# Patient Record
Sex: MALE | Race: WHITE | NOT HISPANIC OR LATINO | Employment: UNEMPLOYED | URBAN - METROPOLITAN AREA
[De-identification: names, ages, dates, MRNs, and addresses within clinical notes are randomized per-mention and may not be internally consistent; named-entity substitution may affect disease eponyms.]

---
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
## 2025-01-16 ENCOUNTER — TELEMEDICINE (OUTPATIENT)
Dept: PSYCHIATRY | Facility: CLINIC | Age: 20
End: 2025-01-16
Payer: COMMERCIAL

## 2025-01-16 DIAGNOSIS — F41.1 GAD (GENERALIZED ANXIETY DISORDER): ICD-10-CM

## 2025-01-16 DIAGNOSIS — F33.0 MILD EPISODE OF RECURRENT MAJOR DEPRESSIVE DISORDER (HCC): ICD-10-CM

## 2025-01-16 PROCEDURE — 90833 PSYTX W PT W E/M 30 MIN: CPT | Performed by: PHYSICIAN ASSISTANT

## 2025-01-16 PROCEDURE — 99214 OFFICE O/P EST MOD 30 MIN: CPT | Performed by: PHYSICIAN ASSISTANT

## 2025-01-16 RX ORDER — FLUOXETINE HYDROCHLORIDE 60 MG/1
60 TABLET, FILM COATED ORAL; ORAL DAILY
Qty: 30 TABLET | Refills: 1 | Status: SHIPPED | OUTPATIENT
Start: 2025-01-16

## 2025-01-16 NOTE — PSYCH
"MEDICATION MANAGEMENT NOTE      [facility]     Patient is located at Home in the following state in which I hold an active license [place]  The patient was identified by name and date of birth. Patient was informed that this is a telemedicine visit and that the visit is being conducted throughthe Epic Embedded platform. He agrees to proceed..  My office door was closed. No one else was in the room.  She acknowledged consent and understanding of privacy and security of the video platform. The patient has agreed to participate and understands they can discontinue the visit at any time.    Patient is aware this is a billable service.    Assessment & Plan  JOSE M (generalized anxiety disorder)  Fluoxetine increased to 60 mg daily.  Follow-up visit in 1 month or sooner if needed.  Call with any questions or concerns.  Patient contracts for safety denies SI.    Orders:    FLUoxetine 60 MG TABS; Take 1 tablet (60 mg total) by mouth daily    Mild episode of recurrent major depressive disorder (HCC)  Fluoxetine increased to 60 mg daily  Orders:    FLUoxetine 60 MG TABS; Take 1 tablet (60 mg total) by mouth daily       Visit Time    Visit Start Time: 1350  Visit Stop Time: 1420  Total Visit Duration: 30 minutes    Subjective:    Singh Waters is a 19 y.o. male seen virtually today for medication management psychiatric follow-up visit.  Patient reported some added stressors in his life right now.  Patient currently on winter break from college and is anxious about returning back to school.  Patient also states lots of \"family drama\" over the holidays which was also added stressor.  Patient overall feels that the Prozac is helping with his anxiety and depression but feels increase in dosage may be warranted.  Patient denies any SI or HI.  No delusional thinking, paranoia, jesus or psychosis noted.    HPI ROS Appetite Changes and Sleep: normal appetite, normal energy level, and normal " number of sleep hours    Review Of Systems:     Mood Anxiety   Behavior Normal    Thought Content Normal   General Normal    Personality Normal   Other Psych Symptoms Normal   Constitutional Negative   ENT Negative   Cardiovascular Negative   Respiratory Negative   Gastrointestinal Negative   Genitourinary Negative   Musculoskeletal Negative   Integumentary Negative   Neurological Negative   Endocrine Normal    Other Symptoms Normal        Social History     Substance and Sexual Activity   Drug Use Never            Laboratory Results: I have personally reviewed all pertinent laboratory/tests results            Family History   Problem Relation Age of Onset    Cancer Mother     Diabetes Father     Hypertension Father     Hyperlipidemia Father     Depression Brother     Depression Brother     Anxiety disorder Brother     Hypertension Brother     Squamous cell carcinoma Maternal Grandmother           The following portions of the patient's history were reviewed and updated as appropriate: allergies, current medications, past family history, past medical history, past social history, and problem list.    Social History        Social Drivers of Health     Tobacco Use: Low Risk  (10/24/2024)    Patient History     Smoking Tobacco Use: Never     Smokeless Tobacco Use: Never     Passive Exposure: Not on file   Alcohol Use: Not on file   Financial Resource Strain: Not on file   Food Insecurity: Not on file   Transportation Needs: Not on file   Physical Activity: Sufficiently Active (9/28/2022)    Exercise Vital Sign     Days of Exercise per Week: 4 days     Minutes of Exercise per Session: 60 min   Stress: Not on file   Social Connections: Not on file   Intimate Partner Violence: Not on file   Depression: Not on file   Housing Stability: Not on file   Utilities: Not on file   Health Literacy: Not on file      Objective:       Mental Status Evaluation:    Appearance:  age appropriate, dressed appropriately, adequate grooming    Behavior:  pleasant, cooperative, calm   Speech:  normal rate and volume   Mood:  anxious   Affect:  appropriate   Thought Process:  linear   Associations: intact associations   Thought Content:  no overt delusions   Perceptual Disturbances: no auditory hallucinations, no visual hallucinations   Risk Potential: Suicidal ideation - None  Homicidal ideation - None  Potential for aggression - No   Sensorium:  oriented to person, place, and time/date   Memory:  recent and remote memory grossly intact   Consciousness:  alert and awake   Attention: attention span and concentration are age appropriate   Insight:  good   Judgment: good   Gait/Station: normal gait/station   Motor Activity: no abnormal movements            Treatment Recommendations- Risks Benefits      Immediate Medical/Psychiatric/Psychotherapy Treatments and Any Precautions:    Risks, Benefits And Possible Side Effects Of Medications:  Risks, benefits, and possible side effects of medications explained to patient and patient verbalizes understanding     Discussed risks and benefits of treatment with patient including risk of suicidality, serotonin syndrome, increased QTc interval and SIADH related to treatment with antidepressants; Risk of induction of manic symptoms in certain patient populations     Controlled Medication Discussion:   Not applicable - controlled prescriptions are not prescribed by this practice    Individual psychotherapy provided: Yes  Counseling was provided during the session today for 16 minutes.  Medication changes discussed with Singh.  Goals discussed during in session: decrease anxiety and decrease depression.   Importance of medication and treatment compliance reviewed with Singh.  Reassurance and supportive therapy provided.   Crisis/safety plan [facility]15320} minutes.      Depression Follow-up Plan Completed: Yes    This note was not shared with the patient due to this is a psychotherapy note      Singh also verbalized an adequate safety plan to utilize should he feel he becomes a danger to self or others.  This plan includes him utilizing crisis hotline, or returning to the nearest emergency department.

## 2025-01-16 NOTE — ASSESSMENT & PLAN NOTE
Fluoxetine increased to 60 mg daily.  Follow-up visit in 1 month or sooner if needed.  Call with any questions or concerns.  Patient contracts for safety denies SI.    Orders:    FLUoxetine 60 MG TABS; Take 1 tablet (60 mg total) by mouth daily

## 2025-02-11 ENCOUNTER — OFFICE VISIT (OUTPATIENT)
Age: 20
End: 2025-02-11
Payer: COMMERCIAL

## 2025-02-11 VITALS
HEIGHT: 76 IN | TEMPERATURE: 96.5 F | OXYGEN SATURATION: 99 % | BODY MASS INDEX: 28.11 KG/M2 | DIASTOLIC BLOOD PRESSURE: 70 MMHG | HEART RATE: 86 BPM | SYSTOLIC BLOOD PRESSURE: 130 MMHG | WEIGHT: 230.82 LBS | RESPIRATION RATE: 18 BRPM

## 2025-02-11 DIAGNOSIS — Z23 ENCOUNTER FOR IMMUNIZATION: ICD-10-CM

## 2025-02-11 DIAGNOSIS — S61.011A LACERATION OF RIGHT THUMB WITHOUT FOREIGN BODY WITHOUT DAMAGE TO NAIL, INITIAL ENCOUNTER: Primary | ICD-10-CM

## 2025-02-11 PROCEDURE — S9083 URGENT CARE CENTER GLOBAL: HCPCS

## 2025-02-11 PROCEDURE — G0382 LEV 3 HOSP TYPE B ED VISIT: HCPCS

## 2025-02-11 PROCEDURE — 90715 TDAP VACCINE 7 YRS/> IM: CPT

## 2025-02-11 PROCEDURE — 90471 IMMUNIZATION ADMIN: CPT

## 2025-02-11 PROCEDURE — 12001 RPR S/N/AX/GEN/TRNK 2.5CM/<: CPT

## 2025-02-11 RX ORDER — CEPHALEXIN 500 MG/1
500 CAPSULE ORAL EVERY 6 HOURS SCHEDULED
Qty: 28 CAPSULE | Refills: 0 | Status: SHIPPED | OUTPATIENT
Start: 2025-02-11 | End: 2025-02-18

## 2025-02-11 NOTE — PROGRESS NOTES
[facility]        NAME: Singh Waters is a 19 y.o. male  : 2005    MRN: [id number]  DATE: 2025  TIME: 1:58 PM    Assessment and Plan   Laceration of right thumb without foreign body without damage to nail, initial encounter [S61.011A]  1. Laceration of right thumb without foreign body without damage to nail, initial encounter  cephalexin (KEFLEX) 500 mg capsule    Laceration repair      2. Encounter for immunization  Tdap Vaccine greater than or equal to 6yo            Patient Instructions   Skin glue was applied to the laceration after well approximating the wound.  You do not have to do anything with the skin glue.  Do not apply antibiotic ointment and do not rub vigorously when washing your hands.  You are okay to apply a small Band-Aid on top as that is where a pen would be sitting when you are writing.    Take antibiotics as prescribed as prophylaxis due to the computer you were working on being dirty from cat urine.    Follow up with Primary Care Provider in 3-5 days if not improving.  Proceed to Emergency Department if symptoms worsen.    If tests have been performed at [facility], our office will contact you with results if changes need to be made to the care plan discussed with you at the visit.  [facility]    Chief Complaint     Chief Complaint   Patient presents with   • Laceration     C/o laceration to right thumb, yesterday around 4 pm. He was working on a mac book and cut it on the computer, he does note the computer had cat urine on it.          History of Present Illness       Laceration to base of his right thumb yesterday about 4 PM when he was trying to fix someone's computer and was taking the covers off.  He reports that the person who he was fixing the computer for did say that her cat peed on the computer he was fixing.    Laceration         Review of Systems   Review of Systems   Constitutional:  Negative for chills and  "fever.   Respiratory:  Negative for cough and shortness of breath.    Cardiovascular:  Negative for chest pain.   Skin:  Positive for wound.         Current Medications       Current Outpatient Medications:   •  cephalexin (KEFLEX) 500 mg capsule, Take 1 capsule (500 mg total) by mouth every 6 (six) hours for 7 days, Disp: 28 capsule, Rfl: 0  •  FLUoxetine 60 MG TABS, Take 1 tablet (60 mg total) by mouth daily, Disp: 30 tablet, Rfl: 1  •  PREVIDENT 5000 BOOSTER PLUS 1.1 % PSTE, Use as directed (Patient not taking: Reported on 6/6/2024), Disp: , Rfl: 5    Current Allergies     Allergies as of 02/11/2025   • (No Known Allergies)            The following portions of the patient's history were reviewed and updated as appropriate: allergies, current medications, past family history, past medical history, past social history, past surgical history and problem list.     Past Medical History:   Diagnosis Date   • Anxiety    • Depression    • Headache    • Wears glasses        Past Surgical History:   Procedure Laterality Date   • CIRCUMCISION     • MYRINGOTOMY         Family History   Problem Relation Age of Onset   • Cancer Mother    • Diabetes Father    • Hypertension Father    • Hyperlipidemia Father    • Depression Brother    • Depression Brother    • Anxiety disorder Brother    • Hypertension Brother    • Squamous cell carcinoma Maternal Grandmother          Medications have been verified.        Objective   /70 (BP Location: Left arm, Patient Position: Sitting)   Pulse 86   Temp (!) 96.5 °F (35.8 °C)   Resp 18   Ht 6' 4\" (1.93 m)   Wt 105 kg (230 lb 13.2 oz)   SpO2 99%   BMI 28.10 kg/m²   No LMP for male patient.      Physical Exam     Physical Exam  Vitals and nursing note reviewed.   Pulmonary:      Effort: Pulmonary effort is normal.   Musculoskeletal:        Hands:    Neurological:      General: No focal deficit present.      Mental Status: He is oriented to person, place, and time.      Sensory: No " sensory deficit.      Motor: No weakness.       Universal Protocol:  procedure performed by consultantConsent: Verbal consent obtained.  Laceration repair    Date/Time: 2/11/2025 12:30 PM    Performed by: MANDI Joseph  Authorized by: MANDI Joseph  Body area: upper extremity  Location details: right thumb  Laceration length: 1 cm  Foreign bodies: no foreign bodies  Tendon involvement: none  Nerve involvement: none      Procedure Details:  Skin closure: glue

## 2025-02-11 NOTE — PATIENT INSTRUCTIONS
Skin glue was applied to the laceration after well approximating the wound.  You do not have to do anything with the skin glue.  Do not apply antibiotic ointment and do not rub vigorously when washing your hands.  You are okay to apply a small Band-Aid on top as that is where a pen would be sitting when you are writing.    Take antibiotics as prescribed as prophylaxis due to the computer you were working on being dirty from cat urine.    Follow up with Primary Care Provider in 3-5 days if not improving.  Proceed to Emergency Department if symptoms worsen.    If tests have been performed at [facility], our office will contact you with results if changes need to be made to the care plan discussed with you at the visit.  You can review your full results on [facility].

## 2025-02-18 ENCOUNTER — TELEMEDICINE (OUTPATIENT)
Dept: PSYCHIATRY | Facility: CLINIC | Age: 20
End: 2025-02-18
Payer: COMMERCIAL

## 2025-02-18 DIAGNOSIS — F33.0 MILD EPISODE OF RECURRENT MAJOR DEPRESSIVE DISORDER (HCC): ICD-10-CM

## 2025-02-18 DIAGNOSIS — F41.1 GAD (GENERALIZED ANXIETY DISORDER): Primary | ICD-10-CM

## 2025-02-18 PROCEDURE — 99214 OFFICE O/P EST MOD 30 MIN: CPT | Performed by: PHYSICIAN ASSISTANT

## 2025-02-18 RX ORDER — FLUOXETINE HYDROCHLORIDE 60 MG/1
60 TABLET, FILM COATED ORAL; ORAL DAILY
Qty: 90 TABLET | Refills: 1 | Status: SHIPPED | OUTPATIENT
Start: 2025-02-18

## 2025-02-18 RX ORDER — BUSPIRONE HYDROCHLORIDE 5 MG/1
5 TABLET ORAL 2 TIMES DAILY
Qty: 180 TABLET | Refills: 1 | Status: SHIPPED | OUTPATIENT
Start: 2025-02-18

## 2025-02-18 NOTE — PSYCH
MEDICATION MANAGEMENT NOTE      [facility]   Patient is located at Home in the following state in which I hold an active license [place]  The patient was identified by name and date of birth. Patient was informed that this is a telemedicine visit and that the visit is being conducted throughthe Epic Embedded platform. He agrees to proceed..  My office door was closed. No one else was in the room.  She acknowledged consent and understanding of privacy and security of the video platform. The patient has agreed to participate and understands they can discontinue the visit at any time.    Patient is aware this is a billable service.      Assessment & Plan  JOSE M (generalized anxiety disorder)  Continue fluoxetine at 60 mg daily which was increased last visit.  Start BuSpar 5 mg twice daily.  Patient reports would be difficult to take the medication 3 times daily.  Patient contracts for safety denies SI.  Patient will follow-up in 1 month or sooner if needed.  Patient will continue with psychotherapy  Orders:    FLUoxetine 60 MG TABS; Take 1 tablet (60 mg total) by mouth daily    busPIRone (BUSPAR) 5 mg tablet; Take 1 tablet (5 mg total) by mouth 2 (two) times a day    Mild episode of recurrent major depressive disorder (HCC)  Continue fluoxetine at 60 mg daily  Orders:    FLUoxetine 60 MG TABS; Take 1 tablet (60 mg total) by mouth daily       Visit Time    Visit Start Time: 1330  Visit Stop Time: 1400  Total Visit Duration: 30 minutes    Subjective:    Singh Waters is a 19 y.o. male seen today virtually for medication management psychiatric follow-up.  Patient reporting that has some increased stressors in his life secondary to college courses.  Patient states continues to follow with private psychotherapist and working on coping skills.  Patient currently maxed out on the fluoxetine at 60 mg daily.  Patient denies any SI or HI and contracts for safety.  Patient inquiring if  there is any psychotropic medication that could help augment his Prozac.  Patient denies any jesus, delusional thinking, paranoia or psychosis.    HPI ROS Appetite Changes and Sleep: normal appetite and normal number of sleep hours    Review Of Systems:     Mood Anxiety   Behavior Normal    Thought Content Normal   General Normal    Personality Normal   Other Psych Symptoms Normal   Constitutional Negative   ENT Negative   Cardiovascular Negative   Respiratory Negative   Gastrointestinal Negative   Genitourinary Negative   Musculoskeletal Negative   Integumentary Negative   Neurological Negative   Endocrine Normal    Other Symptoms Normal        Social History     Substance and Sexual Activity   Drug Use Never            Laboratory Results: I have personally reviewed all pertinent laboratory/tests results    Recent Labs:   No visits with results within 1 Month(s) from this visit.   Latest known visit with results is:   No results found for any previous visit.           Family History   Problem Relation Age of Onset    Cancer Mother     Diabetes Father     Hypertension Father     Hyperlipidemia Father     Depression Brother     Depression Brother     Anxiety disorder Brother     Hypertension Brother     Squamous cell carcinoma Maternal Grandmother           The following portions of the patient's history were reviewed and updated as appropriate: allergies, current medications, past medical history, past social history, and problem list.  Social History     Socioeconomic History    Marital status: Single     Spouse name: Not on file    Number of children: Not on file    Years of education: Not on file    Highest education level: Not on file   Occupational History    Not on file   Tobacco Use    Smoking status: Never    Smokeless tobacco: Never   Vaping Use    Vaping status: Never Used   Substance and Sexual Activity    Alcohol use: Yes    Drug use: Never    Sexual activity: Not Currently   Other Topics Concern    Not  on file   Social History Narrative    8th grade    Basketball    Dogs        What type of home do you live in: Townhouse    Age of your home: 20 yrs    How long have you been living there: 5 yrs    Type of heat: Forced hot air    Type of fuel: Electric    What type of juan c is in your bedroom: Area rugs and Other laminate    Do you have the following in or near your home:    Air products: Central air and Ionic air purifier    Pests: Mice    Pets: 2Dogs    Basement: Dry and Finished              Social Drivers of Health     Financial Resource Strain: Not on file   Food Insecurity: Not on file   Transportation Needs: Not on file   Physical Activity: Sufficiently Active (9/28/2022)    Exercise Vital Sign     Days of Exercise per Week: 4 days     Minutes of Exercise per Session: 60 min   Stress: Not on file   Social Connections: Not on file   Intimate Partner Violence: Not on file   Housing Stability: Not on file      Objective:       Mental Status Evaluation:    Appearance:  age appropriate, casually dressed, adequate grooming   Behavior:  pleasant, cooperative   Speech:  normal rate and volume   Mood:  anxious   Affect:  normal range and intensity   Thought Process:  organized, logical, goal directed   Associations: intact associations   Thought Content:  no overt delusions   Perceptual Disturbances: no auditory hallucinations, no visual hallucinations, does not appear responding to internal stimuli   Risk Potential: Suicidal ideation - None  Homicidal ideation - None  Potential for aggression - No   Sensorium:  oriented to person, place, and time/date   Memory:  recent and remote memory grossly intact   Consciousness:  alert and awake   Attention: attention span and concentration are age appropriate   Insight:  good   Judgment: good   Gait/Station: normal gait/station, normal balance   Motor Activity: no abnormal movements            Treatment Recommendations- Risks Benefits      Immediate  Medical/Psychiatric/Psychotherapy Treatments and Any Precautions:    Risks, Benefits And Possible Side Effects Of Medications:  Risks, benefits, and possible side effects of medications explained to patient and patient verbalizes understanding     Discussed risks and benefits of treatment with patient including risk of suicidality, serotonin syndrome, increased QTc interval and SIADH related to treatment with antidepressants; Risk of induction of manic symptoms in certain patient populations     Controlled Medication Discussion:   Not applicable - controlled prescriptions are not prescribed by this practice    Individual psychotherapy provided: No      Depression Follow-up Plan Completed: Yes    This note was not shared with the patient due to this is a psychotherapy note     Singh Waters also verbalized an adequate safety plan to utilize should they feel they are becoming a danger to self or others.  This plan includes patient utilizing crisis hotline, or returning to the nearest emergency department.

## 2025-02-18 NOTE — ASSESSMENT & PLAN NOTE
Continue fluoxetine at 60 mg daily which was increased last visit.  Start BuSpar 5 mg twice daily.  Patient reports would be difficult to take the medication 3 times daily.  Patient contracts for safety denies SI.  Patient will follow-up in 1 month or sooner if needed.  Patient will continue with psychotherapy  Orders:    FLUoxetine 60 MG TABS; Take 1 tablet (60 mg total) by mouth daily    busPIRone (BUSPAR) 5 mg tablet; Take 1 tablet (5 mg total) by mouth 2 (two) times a day

## 2025-03-26 ENCOUNTER — TELEPHONE (OUTPATIENT)
Age: 20
End: 2025-03-26